# Patient Record
Sex: FEMALE | Race: OTHER | NOT HISPANIC OR LATINO | Employment: UNEMPLOYED | URBAN - METROPOLITAN AREA
[De-identification: names, ages, dates, MRNs, and addresses within clinical notes are randomized per-mention and may not be internally consistent; named-entity substitution may affect disease eponyms.]

---

## 2024-04-19 DIAGNOSIS — E10.40 DM NEUROPATHY, TYPE I DIABETES MELLITUS: Primary | ICD-10-CM

## 2024-05-05 ENCOUNTER — HOSPITAL ENCOUNTER (EMERGENCY)
Facility: HOSPITAL | Age: 24
Discharge: HOME HEALTH | End: 2024-05-05
Attending: STUDENT IN AN ORGANIZED HEALTH CARE EDUCATION/TRAINING PROGRAM
Payer: MEDICAID

## 2024-05-05 VITALS
DIASTOLIC BLOOD PRESSURE: 74 MMHG | OXYGEN SATURATION: 100 % | TEMPERATURE: 98 F | BODY MASS INDEX: 19.99 KG/M2 | WEIGHT: 120 LBS | SYSTOLIC BLOOD PRESSURE: 122 MMHG | HEART RATE: 103 BPM | HEIGHT: 65 IN | RESPIRATION RATE: 17 BRPM

## 2024-05-05 DIAGNOSIS — F41.9 ANXIETY: ICD-10-CM

## 2024-05-05 DIAGNOSIS — R07.9 CHEST PAIN: ICD-10-CM

## 2024-05-05 DIAGNOSIS — E10.69 TYPE 1 DIABETES MELLITUS WITH OTHER SPECIFIED COMPLICATION: Primary | ICD-10-CM

## 2024-05-05 LAB
ALBUMIN SERPL-MCNC: 4.1 G/DL (ref 3.5–5)
ALBUMIN/GLOB SERPL: 1.3 RATIO (ref 1.1–2)
ALP SERPL-CCNC: 72 UNIT/L (ref 40–150)
ALT SERPL-CCNC: 15 UNIT/L (ref 0–55)
APPEARANCE UR: CLEAR
AST SERPL-CCNC: 20 UNIT/L (ref 5–34)
B-HCG SERPL QL: NEGATIVE
B-HCG UR QL: NEGATIVE
BACTERIA #/AREA URNS AUTO: ABNORMAL /HPF
BASOPHILS # BLD AUTO: 0.05 X10(3)/MCL
BASOPHILS NFR BLD AUTO: 0.5 %
BILIRUB SERPL-MCNC: 0.6 MG/DL
BILIRUB UR QL STRIP.AUTO: NEGATIVE
BUN SERPL-MCNC: 12 MG/DL (ref 7–18.7)
CALCIUM SERPL-MCNC: 9.7 MG/DL (ref 8.4–10.2)
CHLORIDE SERPL-SCNC: 104 MMOL/L (ref 98–107)
CO2 SERPL-SCNC: 19 MMOL/L (ref 22–29)
COLOR UR AUTO: ABNORMAL
CREAT SERPL-MCNC: 0.79 MG/DL (ref 0.55–1.02)
CTP QC/QA: YES
D DIMER PPP IA.FEU-MCNC: <0.27 UG/ML FEU (ref 0–0.5)
EOSINOPHIL # BLD AUTO: 0.21 X10(3)/MCL (ref 0–0.9)
EOSINOPHIL NFR BLD AUTO: 2.1 %
ERYTHROCYTE [DISTWIDTH] IN BLOOD BY AUTOMATED COUNT: 11.8 % (ref 11.5–17)
GFR SERPLBLD CREATININE-BSD FMLA CKD-EPI: >60 MLS/MIN/1.73/M2
GLOBULIN SER-MCNC: 3.1 GM/DL (ref 2.4–3.5)
GLUCOSE SERPL-MCNC: 258 MG/DL (ref 74–100)
GLUCOSE UR QL STRIP.AUTO: ABNORMAL
HCT VFR BLD AUTO: 41 % (ref 37–47)
HGB BLD-MCNC: 13.8 G/DL (ref 12–16)
IMM GRANULOCYTES # BLD AUTO: 0.01 X10(3)/MCL (ref 0–0.04)
IMM GRANULOCYTES NFR BLD AUTO: 0.1 %
KETONES UR QL STRIP.AUTO: ABNORMAL
LEUKOCYTE ESTERASE UR QL STRIP.AUTO: NEGATIVE
LYMPHOCYTES # BLD AUTO: 2.39 X10(3)/MCL (ref 0.6–4.6)
LYMPHOCYTES NFR BLD AUTO: 23.4 %
MCH RBC QN AUTO: 28.4 PG (ref 27–31)
MCHC RBC AUTO-ENTMCNC: 33.7 G/DL (ref 33–36)
MCV RBC AUTO: 84.4 FL (ref 80–94)
MONOCYTES # BLD AUTO: 0.57 X10(3)/MCL (ref 0.1–1.3)
MONOCYTES NFR BLD AUTO: 5.6 %
MUCOUS THREADS URNS QL MICRO: ABNORMAL /LPF
NEUTROPHILS # BLD AUTO: 6.99 X10(3)/MCL (ref 2.1–9.2)
NEUTROPHILS NFR BLD AUTO: 68.3 %
NITRITE UR QL STRIP.AUTO: NEGATIVE
NRBC BLD AUTO-RTO: 0 %
PH UR STRIP.AUTO: 6 [PH]
PLATELET # BLD AUTO: 373 X10(3)/MCL (ref 130–400)
PMV BLD AUTO: 10.8 FL (ref 7.4–10.4)
POTASSIUM SERPL-SCNC: 3.8 MMOL/L (ref 3.5–5.1)
PROT SERPL-MCNC: 7.2 GM/DL (ref 6.4–8.3)
PROT UR QL STRIP.AUTO: ABNORMAL
RBC # BLD AUTO: 4.86 X10(6)/MCL (ref 4.2–5.4)
RBC #/AREA URNS AUTO: ABNORMAL /HPF
RBC UR QL AUTO: NEGATIVE
SODIUM SERPL-SCNC: 136 MMOL/L (ref 136–145)
SP GR UR STRIP.AUTO: 1.02 (ref 1–1.03)
SQUAMOUS #/AREA URNS LPF: ABNORMAL /HPF
T3FREE SERPL-MCNC: 3.09 PG/ML (ref 1.58–3.91)
T4 FREE SERPL-MCNC: 1.04 NG/DL (ref 0.7–1.48)
TROPONIN I SERPL-MCNC: <0.01 NG/ML (ref 0–0.04)
TSH SERPL-ACNC: 0.32 UIU/ML (ref 0.35–4.94)
UROBILINOGEN UR STRIP-ACNC: NORMAL
WBC # SPEC AUTO: 10.22 X10(3)/MCL (ref 4.5–11.5)
WBC #/AREA URNS AUTO: ABNORMAL /HPF

## 2024-05-05 PROCEDURE — 80053 COMPREHEN METABOLIC PANEL: CPT | Performed by: NURSE PRACTITIONER

## 2024-05-05 PROCEDURE — 85025 COMPLETE CBC W/AUTO DIFF WBC: CPT | Performed by: NURSE PRACTITIONER

## 2024-05-05 PROCEDURE — 81025 URINE PREGNANCY TEST: CPT | Performed by: NURSE PRACTITIONER

## 2024-05-05 PROCEDURE — 96375 TX/PRO/DX INJ NEW DRUG ADDON: CPT

## 2024-05-05 PROCEDURE — 96361 HYDRATE IV INFUSION ADD-ON: CPT

## 2024-05-05 PROCEDURE — 84484 ASSAY OF TROPONIN QUANT: CPT | Performed by: PHYSICIAN ASSISTANT

## 2024-05-05 PROCEDURE — 84481 FREE ASSAY (FT-3): CPT | Performed by: STUDENT IN AN ORGANIZED HEALTH CARE EDUCATION/TRAINING PROGRAM

## 2024-05-05 PROCEDURE — 25000003 PHARM REV CODE 250: Performed by: PHYSICIAN ASSISTANT

## 2024-05-05 PROCEDURE — 63600175 PHARM REV CODE 636 W HCPCS: Performed by: PHYSICIAN ASSISTANT

## 2024-05-05 PROCEDURE — 84443 ASSAY THYROID STIM HORMONE: CPT | Performed by: STUDENT IN AN ORGANIZED HEALTH CARE EDUCATION/TRAINING PROGRAM

## 2024-05-05 PROCEDURE — 63600175 PHARM REV CODE 636 W HCPCS: Performed by: STUDENT IN AN ORGANIZED HEALTH CARE EDUCATION/TRAINING PROGRAM

## 2024-05-05 PROCEDURE — 81001 URINALYSIS AUTO W/SCOPE: CPT | Performed by: NURSE PRACTITIONER

## 2024-05-05 PROCEDURE — 84439 ASSAY OF FREE THYROXINE: CPT | Performed by: STUDENT IN AN ORGANIZED HEALTH CARE EDUCATION/TRAINING PROGRAM

## 2024-05-05 PROCEDURE — 93005 ELECTROCARDIOGRAM TRACING: CPT

## 2024-05-05 PROCEDURE — 96374 THER/PROPH/DIAG INJ IV PUSH: CPT

## 2024-05-05 PROCEDURE — 85379 FIBRIN DEGRADATION QUANT: CPT | Performed by: STUDENT IN AN ORGANIZED HEALTH CARE EDUCATION/TRAINING PROGRAM

## 2024-05-05 PROCEDURE — 99285 EMERGENCY DEPT VISIT HI MDM: CPT | Mod: 25

## 2024-05-05 PROCEDURE — 81025 URINE PREGNANCY TEST: CPT | Performed by: PHYSICIAN ASSISTANT

## 2024-05-05 RX ORDER — LORAZEPAM 2 MG/ML
1 INJECTION INTRAMUSCULAR
Status: COMPLETED | OUTPATIENT
Start: 2024-05-05 | End: 2024-05-05

## 2024-05-05 RX ORDER — KETOROLAC TROMETHAMINE 10 MG/1
10 TABLET, FILM COATED ORAL EVERY 6 HOURS PRN
Qty: 20 TABLET | Refills: 0 | Status: SHIPPED | OUTPATIENT
Start: 2024-05-05 | End: 2024-05-10

## 2024-05-05 RX ORDER — DROPERIDOL 2.5 MG/ML
1.25 INJECTION, SOLUTION INTRAMUSCULAR; INTRAVENOUS
Status: COMPLETED | OUTPATIENT
Start: 2024-05-05 | End: 2024-05-05

## 2024-05-05 RX ORDER — KETOROLAC TROMETHAMINE 30 MG/ML
30 INJECTION, SOLUTION INTRAMUSCULAR; INTRAVENOUS
Status: COMPLETED | OUTPATIENT
Start: 2024-05-05 | End: 2024-05-05

## 2024-05-05 RX ORDER — HYDROMORPHONE HYDROCHLORIDE 2 MG/ML
1 INJECTION, SOLUTION INTRAMUSCULAR; INTRAVENOUS; SUBCUTANEOUS
Status: DISCONTINUED | OUTPATIENT
Start: 2024-05-05 | End: 2024-05-05

## 2024-05-05 RX ORDER — OXYCODONE AND ACETAMINOPHEN 5; 325 MG/1; MG/1
1 TABLET ORAL EVERY 6 HOURS PRN
Qty: 16 TABLET | Refills: 0 | Status: SHIPPED | OUTPATIENT
Start: 2024-05-05 | End: 2024-05-09

## 2024-05-05 RX ORDER — DICLOFENAC SODIUM 50 MG/1
50 TABLET, DELAYED RELEASE ORAL 3 TIMES DAILY
Qty: 21 TABLET | Refills: 0 | Status: SHIPPED | OUTPATIENT
Start: 2024-05-05 | End: 2024-05-05 | Stop reason: CLARIF

## 2024-05-05 RX ORDER — OXYCODONE HYDROCHLORIDE 10 MG/1
10 TABLET ORAL
Status: COMPLETED | OUTPATIENT
Start: 2024-05-05 | End: 2024-05-05

## 2024-05-05 RX ORDER — ALPRAZOLAM 0.5 MG/1
0.5 TABLET ORAL 3 TIMES DAILY PRN
Qty: 15 TABLET | Refills: 0 | Status: SHIPPED | OUTPATIENT
Start: 2024-05-05 | End: 2024-05-16

## 2024-05-05 RX ADMIN — SODIUM CHLORIDE, POTASSIUM CHLORIDE, SODIUM LACTATE AND CALCIUM CHLORIDE 1000 ML: 600; 310; 30; 20 INJECTION, SOLUTION INTRAVENOUS at 05:05

## 2024-05-05 RX ADMIN — OXYCODONE HYDROCHLORIDE 10 MG: 10 TABLET ORAL at 05:05

## 2024-05-05 RX ADMIN — LORAZEPAM 1 MG: 2 INJECTION INTRAMUSCULAR; INTRAVENOUS at 02:05

## 2024-05-05 RX ADMIN — KETOROLAC TROMETHAMINE 30 MG: 30 INJECTION, SOLUTION INTRAMUSCULAR at 02:05

## 2024-05-05 RX ADMIN — DROPERIDOL 1.25 MG: 2.5 INJECTION, SOLUTION INTRAMUSCULAR; INTRAVENOUS at 03:05

## 2024-05-05 RX ADMIN — SODIUM CHLORIDE, POTASSIUM CHLORIDE, SODIUM LACTATE AND CALCIUM CHLORIDE 2000 ML: 600; 310; 30; 20 INJECTION, SOLUTION INTRAVENOUS at 03:05

## 2024-05-05 RX ADMIN — SODIUM CHLORIDE, POTASSIUM CHLORIDE, SODIUM LACTATE AND CALCIUM CHLORIDE 1000 ML: 600; 310; 30; 20 INJECTION, SOLUTION INTRAVENOUS at 01:05

## 2024-05-05 NOTE — DISCHARGE INSTRUCTIONS
Make sure you are taking insulin daily.  Use Xanax as needed for anxiety.  Take diclofenac for pain and swelling.  Use Percocet for severe pain.  Do not drink or drive while taking narcotics or Xanax as can be sedating.  Take MiraLax daily to help with constipation make sure drinking plenty of fluids.  Follow up with PCP/Endocrinology for further evaluation and treatment.

## 2024-05-05 NOTE — ED PROVIDER NOTES
Encounter Date: 5/5/2024       History     Chief Complaint   Patient presents with    Chest Pain    Abdominal Pain    diabetic issue     C/o of gen pain x4 week ago. Dx with diabetic neuopathy 4 weeks ago. Stating having pain in BLE, CP, and abd pain. Denies N/V/D & fever.      23-year-old female with a history of diabetes and diabetic neuropathy presents to ED for evaluation of generalized pain worsening over the last several days.  Patient states that she feels a burning in her lungs.  States she was having difficulty taking a deep breath due to the burning.  States she was currently waiting on an insulin pump.  Reports that she has been placed on Cymbalta, gabapentin, tramadol, Percocet without relief.  States she can sometimes take Xanax which is the only thing that gives her somewhat of relief.  States she has been seen by multiple ED and has been unable to find an answer.    The history is provided by the patient. No  was used.     Review of patient's allergies indicates:   Allergen Reactions    Bismuth subsalicylate Anaphylaxis    Opioids - morphine analogues Itching     No past medical history on file.  No past surgical history on file.  No family history on file.     Review of Systems   Constitutional:  Negative for chills, fatigue and fever.   Respiratory:  Negative for shortness of breath.    Cardiovascular:  Negative for chest pain.   Gastrointestinal:  Positive for abdominal pain. Negative for diarrhea, nausea and vomiting.   Genitourinary:  Negative for dysuria, flank pain, frequency and urgency.   Musculoskeletal:  Positive for back pain and myalgias.   All other systems reviewed and are negative.      Physical Exam     Initial Vitals [05/05/24 1318]   BP Pulse Resp Temp SpO2   (!) 144/101 (!) 147 (!) 22 98.4 °F (36.9 °C) 99 %      MAP       --         Physical Exam    Nursing note and vitals reviewed.  Constitutional: She appears well-developed and well-nourished.   Patient  anxious crying in room   HENT:   Head: Normocephalic and atraumatic.   Right Ear: Tympanic membrane and external ear normal.   Left Ear: Tympanic membrane and external ear normal.   Mouth/Throat: Uvula is midline, oropharynx is clear and moist and mucous membranes are normal. No trismus in the jaw. No uvula swelling. No oropharyngeal exudate, posterior oropharyngeal edema or posterior oropharyngeal erythema.   Eyes: Conjunctivae are normal. Pupils are equal, round, and reactive to light.   Neck: Neck supple.   Normal range of motion.  Cardiovascular:  Normal rate, regular rhythm and normal heart sounds.           Pulmonary/Chest: Breath sounds normal. She has no wheezes. She has no rhonchi. She has no rales.   Abdominal: Abdomen is soft. Bowel sounds are normal. There is no abdominal tenderness.   Musculoskeletal:         General: Normal range of motion.      Cervical back: Normal range of motion and neck supple.     Neurological: She is alert and oriented to person, place, and time.   Skin: Skin is warm and dry.   Psychiatric: She has a normal mood and affect.         ED Course   Procedures  Labs Reviewed   COMPREHENSIVE METABOLIC PANEL - Abnormal; Notable for the following components:       Result Value    Carbon Dioxide 19 (*)     Glucose Level 258 (*)     All other components within normal limits   URINALYSIS - Abnormal; Notable for the following components:    Protein, UA Trace (*)     Glucose, UA 3+ (*)     Ketones, UA 2+ (*)     Mucous, UA Occasional (*)     All other components within normal limits   CBC WITH DIFFERENTIAL - Abnormal; Notable for the following components:    MPV 10.8 (*)     All other components within normal limits   TSH - Abnormal; Notable for the following components:    TSH 0.320 (*)     All other components within normal limits   PREGNANCY TEST, URINE RAPID - Normal   TROPONIN I - Normal   D DIMER, QUANTITATIVE - Normal   T4, FREE - Normal   T3,FREE ([facility] ONLY) - Normal   CBC W/ AUTO  DIFFERENTIAL    Narrative:     The following orders were created for panel order CBC Auto Differential.  Procedure                               Abnormality         Status                     ---------                               -----------         ------                     CBC with Differential[[id number]]       Abnormal            Final result                 Please view results for these tests on the individual orders.   POCT URINE PREGNANCY     EKG Readings: (Independently Interpreted)   Initial Reading: No STEMI. Rhythm: Sinus Tachycardia. Heart Rate: 124. Ectopy: No Ectopy. Conduction: Normal. ST Segments: Normal ST Segments. T Waves: Normal. Clinical Impression: Sinus Tachycardia       Imaging Results              X-Ray Chest PA And Lateral (Final result)  Result time 05/05/24 13:54:12      Final result by Nasrin Sánchez MD (05/05/24 13:54:12)                   Impression:      No acute cardiopulmonary abnormality.      Electronically signed by: Nasrin Sánchez  Date:    05/05/2024  Time:    13:54               Narrative:    EXAMINATION:  XR CHEST PA AND LATERAL    CLINICAL HISTORY:  Chest pain, unspecified    TECHNIQUE:  Two views of the chest    COMPARISON:  No relevant prior available at the time of dictation.    FINDINGS:  LINES AND TUBES: EKG/telemetry leads overlie the chest.    MEDIASTINUM AND NAYE: The cardiac silhouette is normal.    LUNGS: No lobar consolidation. No edema.    PLEURA:No pleural effusion. No pneumothorax.    BONES: No acute osseous abnormality.                                       Medications   lactated ringers bolus 1,000 mL (0 mLs Intravenous Stopped 5/5/24 1430)   ketorolac injection 30 mg (30 mg Intravenous Given 5/5/24 1427)   LORazepam injection 1 mg (1 mg Intravenous Given 5/5/24 1427)   lactated ringers bolus 2,000 mL (0 mLs Intravenous Stopped 5/5/24 1615)   droPERidol injection 1.25 mg (1.25 mg Intravenous Given 5/5/24 1515)   lactated ringers bolus 1,000 mL  (1,000 mLs Intravenous New Bag 5/5/24 1720)   oxyCODONE immediate release tablet Tab 10 mg (10 mg Oral Given 5/5/24 1715)     Medical Decision Making  23-year-old female with a history of diabetes and diabetic neuropathy presents to ED for evaluation of generalized pain worsening over the last several days.  Patient states that she feels a burning in her lungs.  States she was having difficulty taking a deep breath due to the burning.  States she was currently waiting on an insulin pump.  Reports that she has been placed on Cymbalta, gabapentin, tramadol, Percocet without relief.  States she can sometimes take Xanax which is the only thing that gives her somewhat of relief.  States she has been seen by multiple ED and has been unable to find an answer.    Differential diagnosis includes but is not limited to hyperglycemia, DKA, dehydration, electrolyte abnormality, hyperthyroidism, PE, anxiety    Amount and/or Complexity of Data Reviewed  External Data Reviewed: labs and notes.  Labs: ordered. Decision-making details documented in ED Course.  Radiology: ordered. Decision-making details documented in ED Course.  ECG/medicine tests: ordered and independent interpretation performed. Decision-making details documented in ED Course.  Discussion of management or test interpretation with external provider(s): Patient presents to ED for evaluation of generalized pain with a burning sensation in her lungs.  Patient reports that she was a type 1 diabetic and was recently diagnosed with diabetic neuropathy.  States she has been seen multiple times in his been placed on different medications including gabapentin, Cymbalta, tramadol, and Percocet.  States the only thing that sometimes helps her pain is Xanax.  States that she started having a burning sensation in her lungs in his unable to take a deep breath due to pain.  Patient initially hyperventilating in room.  Patient given Ativan, droperidol, and Toradol with improvement  of symptoms.  Patient is tachycardic and given 4 L of fluids.  No leukocytosis noted.  Glucose of 258 with anion gap of 13.  Thyroid studies obtained with no signs of hyperthyroidism.  Given fluids.  No indication for admission.  Discussed need for follow-up with PCP/Endocrinology.  Patient is in the process of getting an insulin pump.  Discussed following up with Neurology for diabetic neuropathy.  We will give short course of symptomatic care including short course of anti anxiety medication.  Discussed return ED precautions.  Patient verbalizes understanding.  Discussed case with ED attending Dr. Quintanilla, he had face-to-face encounter with the patient and agrees with plan of care.    Risk  Prescription drug management.               ED Course as of 05/05/24 2017   Sun May 05, 2024   1412 Glucose(!): 258 [SL]   1413 CO2(!): 19  Anion gap of 13 [SL]   1413 Troponin I: <0.010 [SL]   1413 WBC: 10.22 [SL]   1413 X-Ray Chest PA And Lateral  No acute abnormality noted [SL]      ED Course User Index  [SL] Brenda Woods PA                           Clinical Impression:  Final diagnoses:  [R07.9] Chest pain  [E10.69] Type 1 diabetes mellitus with other specified complication (Primary)  [F41.9] Anxiety          ED Disposition Condition    Discharge Stable          ED Prescriptions       Medication Sig Dispense Start Date End Date Auth. Provider    ALPRAZolam (XANAX) 0.5 MG tablet Take 1 tablet (0.5 mg total) by mouth 3 (three) times daily as needed for Anxiety. 15 tablet 5/5/2024 5/10/2024 Brenda Woods PA    oxyCODONE-acetaminophen (PERCOCET) 5-325 mg per tablet Take 1 tablet by mouth every 6 (six) hours as needed for Pain. 16 tablet 5/5/2024 5/9/2024 Brenda Woods PA    diclofenac (VOLTAREN) 50 MG EC tablet  (Status: Discontinued) Take 1 tablet (50 mg total) by mouth 3 (three) times daily. for 7 days 21 tablet 5/5/2024 5/5/2024 Brenda Woods PA    ketorolac (TORADOL) 10 mg tablet Take 1 tablet (10 mg total) by mouth every 6 (six)  hours as needed for Pain. 20 tablet 5/5/2024 5/10/2024 Brenda Woods PA          Follow-up Information       Follow up With Specialties Details Why Contact Info    PCP  In 1 week As needed, If you do not have a PCP you may call [phone] to help get set up If you do not have a PCP you may call [phone] to help get set up.             Brenda Woods PA  05/05/24 2017

## 2024-05-05 NOTE — FIRST PROVIDER EVALUATION
Medical screening examination initiated.  I have conducted a focused provider triage encounter, findings are as follows:    Brief history of present illness:  22y/o F presents to the ED with bilateral leg pain that radiates to bilateral feet. States she has been to every hospital and this is her last resort. States having chest pain from being anxious with abdominal pain. Onset four weeks ago.    There were no vitals filed for this visit.    Pertinent physical exam:  AAA x 3    Brief workup plan:  Labs    Preliminary workup initiated; this workup will be continued and followed by the physician or advanced practice provider that is assigned to the patient when roomed.

## 2024-05-06 LAB
OHS QRS DURATION: 70 MS
OHS QTC CALCULATION: 445 MS

## 2024-05-14 NOTE — PROGRESS NOTES
"[facility] Initial Office Visit Note    Initial Visit Date: 5/16/2024  Current Visit Date:  05/16/2024    Chief Complaint:     Chief Complaint   Patient presents with    Pain     Neuropathy all over body    Shortness of Breath     Trouble breathing due to neuropathy    Swelling     Swelling in feet and knees       History of Present Illness:      This is 23 y.o. female with history of type 1 diabetes with DKA, anxiety disorder who is referred for neuropathy. She noted that she had poorly controlled diabetes for at least 4 years. She reports worsening paresthesia in bilateral lower knees. Patient now reports she would occasionally burning sensation in her lungs when her burning sensation worsens. She notes that gabapentin is partially effective. She was on pregabalin from PCP, which she found ineffective. She also find Duloxetine ineffective. She reports history of anxiety that is not well controlled.        Medications:     Current Outpatient Medications   Medication Instructions    albuterol (PROVENTIL/VENTOLIN HFA) 90 mcg/actuation inhaler 2 puffs, Inhalation, Every 6 hours PRN, Rescue    alpha lipoic acid 200 mg, Oral, Daily    ALPRAZolam (XANAX) 1 mg, Oral, 3 times daily PRN    FREESTYLE JOSÉ MIGUEL 2 SENSOR Kit USE FOR continous glucose monitoring    gabapentin (NEURONTIN) 1,600 mg, Oral, 2 times daily    GUARDIAN 4 GLUCOSE SENSOR Bessie Change sensor every  6 days    GUARDIAN 4 TRANSMITTER Bessie Change transmitter every 6 days    insulin aspart U-100 (NOVOLOG) 100 unit/mL (3 mL) InPn pen 5 units in the AM, 7 units with lunch and dinner    insulin syringe-needle U-100 0.5 mL 31 gauge x 5/16" Syrg To use with insulin vial Daily    LANTUS SOLOSTAR U-100 INSULIN 35 Units, Subcutaneous, Daily    magnesium oxide (MAG-OX) 400 mg (241.3 mg magnesium) tablet 1 tablet, Oral, 2 times daily    NOVOLOG U-100 INSULIN ASPART 100 unit/mL injection Inject up to 100 Units via insulin pump per day    oxyCODONE-acetaminophen " "(PERCOCET) 5-325 mg per tablet 1 tablet, Oral, Every 4 hours PRN    traMADoL (ULTRAM) 100 mg, Oral, Nightly PRN    TRUEPLUS INSULIN 0.5 mL 30 gauge x 5/16" Syrg        Labs:     Results for orders placed or performed during the hospital encounter of 05/05/24   Comprehensive metabolic panel   Result Value Ref Range    Sodium 136 136 - 145 mmol/L    Potassium 3.8 3.5 - 5.1 mmol/L    Chloride 104 98 - 107 mmol/L    CO2 19 (L) 22 - 29 mmol/L    Glucose 258 (H) 74 - 100 mg/dL    Blood Urea Nitrogen 12.0 7.0 - 18.7 mg/dL    Creatinine 0.79 0.55 - 1.02 mg/dL    Calcium 9.7 8.4 - 10.2 mg/dL    Protein Total 7.2 6.4 - 8.3 gm/dL    Albumin 4.1 3.5 - 5.0 g/dL    Globulin 3.1 2.4 - 3.5 gm/dL    Albumin/Globulin Ratio 1.3 1.1 - 2.0 ratio    Bilirubin Total 0.6 <=1.5 mg/dL    ALP 72 40 - 150 unit/L    ALT 15 0 - 55 unit/L    AST 20 5 - 34 unit/L    eGFR >60 mls/min/1.73/m2   Pregnancy, urine rapid   Result Value Ref Range    hCG Qualitative, Urine Negative Negative   Urinalysis   Result Value Ref Range    Color, UA Light-Yellow Yellow, Light-Yellow, Colorless, Straw, Dark-Yellow    Appearance, UA Clear Clear    Specific Gravity, UA 1.018 1.005 - 1.030    pH, UA 6.0 5.0 - 8.5    Protein, UA Trace (A) Negative    Glucose, UA 3+ (A) Negative, Normal    Ketones, UA 2+ (A) Negative    Blood, UA Negative Negative    Bilirubin, UA Negative Negative    Urobilinogen, UA Normal 0.2, 1.0, Normal    Nitrites, UA Negative Negative    Leukocyte Esterase, UA Negative Negative    WBC, UA 0-5 None Seen, 0-2, 3-5, 0-5 /HPF    Bacteria, UA Trace None Seen, Trace /HPF    Squamous Epithelial Cells, UA Trace None Seen /HPF    Mucous, UA Occasional (A) None Seen /LPF    RBC, UA 0-5 None Seen, 0-2, 3-5, 0-5 /HPF   CBC with Differential   Result Value Ref Range    WBC 10.22 4.50 - 11.50 x10(3)/mcL    RBC 4.86 4.20 - 5.40 x10(6)/mcL    Hgb 13.8 12.0 - 16.0 g/dL    Hct 41.0 37.0 - 47.0 %    MCV 84.4 80.0 - 94.0 fL    MCH 28.4 27.0 - 31.0 pg    MCHC 33.7 " 33.0 - 36.0 g/dL    RDW 11.8 11.5 - 17.0 %    Platelet 373 130 - 400 x10(3)/mcL    MPV 10.8 (H) 7.4 - 10.4 fL    Neut % 68.3 %    Lymph % 23.4 %    Mono % 5.6 %    Eos % 2.1 %    Basophil % 0.5 %    Lymph # 2.39 0.6 - 4.6 x10(3)/mcL    Neut # 6.99 2.1 - 9.2 x10(3)/mcL    Mono # 0.57 0.1 - 1.3 x10(3)/mcL    Eos # 0.21 0 - 0.9 x10(3)/mcL    Baso # 0.05 <=0.2 x10(3)/mcL    IG# 0.01 0 - 0.04 x10(3)/mcL    IG% 0.1 %    NRBC% 0.0 %   Troponin I   Result Value Ref Range    Troponin-I <0.010 0.000 - 0.045 ng/mL   TSH   Result Value Ref Range    TSH 0.320 (L) 0.350 - 4.940 uIU/mL   D dimer, quantitative   Result Value Ref Range    D-Dimer <0.27 0.00 - 0.50 ug/mL FEU   T4, Free   Result Value Ref Range    Thyroxine Free 1.04 0.70 - 1.48 ng/dL   T3, Free ([facility])   Result Value Ref Range    T3 Free 3.09 1.58 - 3.91 pg/mL   POCT urine pregnancy   Result Value Ref Range    POC Preg Test, Ur Negative Negative     Acceptable Yes    EKG 12-lead   Result Value Ref Range    QRS Duration 70 ms    [facility] QTC Calculation 445 ms       Studies:          Review of Systems:     Review of Systems   All other systems reviewed and are negative.      Physical Exams:     Vitals:    05/16/24 1135   BP: 111/74   Pulse: (!) 132   Resp: 16   Temp: 97.9 °F (36.6 °C)       Physical Exam  Vitals and nursing note reviewed.   Constitutional:       Appearance: Normal appearance.   HENT:      Head: Normocephalic and atraumatic.      Nose: Nose normal.      Mouth/Throat:      Mouth: Mucous membranes are moist.      Pharynx: Oropharynx is clear.   Eyes:      Conjunctiva/sclera: Conjunctivae normal.   Cardiovascular:      Rate and Rhythm: Normal rate and regular rhythm.      Pulses: Normal pulses.   Pulmonary:      Effort: Pulmonary effort is normal.      Breath sounds: Normal breath sounds.   Abdominal:      General: Abdomen is flat.   Musculoskeletal:         General: Normal range of motion.      Cervical back: Normal range of motion.   Skin:      General: Skin is warm.   Neurological:      Mental Status: She is alert.         Comprehensive Neurological Exam:  Mental Status: Alert Oriented to Self, Date, and Place.  Comprehension wnl. No dysarthria.   CN II - XII: SELINA, No APD, VFFC, No ptosis OU, EOMI without nystagmus, LT/Temp symmetric in CN V1-3 distribution, Hearing grossly intact, Face Symmetric, Tongue and Uvula midline, Trapezius symmetric bilateral.   Motor: tone and bulk wnl throughout, no abnormal involuntary or voluntary movements, 5/5 to confrontation, Fine finger movements wnl b/l, No pronator drift.   Sensory: LT, Proprioception, Vibration decrease to bilateral ankles, PP, Temp symmetric decrease to 1/3 bilateral LE.   Reflexes: 2+ throughout, plantar reflexes downward bilateral.   Cerebellar: FNF wnl b/l, RAHM wnl b/l  Romberg: Negative  Tandem Romberg: wnl   Gait: normal.     Assessment:     This is 23 y.o. female with history of  type 1 diabetes, anxiety disorder who is referred for diabetic polyneuropathy and worsening anxiety and depression.     Problem List Items Addressed This Visit    None  Visit Diagnoses       Polyneuropathy due to type 1 diabetes mellitus    -  Primary    Relevant Medications    LANTUS SOLOSTAR U-100 INSULIN 100 unit/mL (3 mL) InPn pen    insulin aspart U-100 (NOVOLOG) 100 unit/mL (3 mL) InPn pen    NOVOLOG U-100 INSULIN ASPART 100 unit/mL injection    DM neuropathy, type I diabetes mellitus        Relevant Medications    LANTUS SOLOSTAR U-100 INSULIN 100 unit/mL (3 mL) InPn pen    insulin aspart U-100 (NOVOLOG) 100 unit/mL (3 mL) InPn pen    NOVOLOG U-100 INSULIN ASPART 100 unit/mL injection            Plan:     [] start Carbamazepine 200 mg twice a day   [] continue with Gabapentin   [] recommend biofeedback and CBT for pain control.   [] CBC,CMP in 2 months     RTC 3 Months      Visit today is associated with current or anticipated ongoing medical care related to this patient's single serious condition/complex  condition as documented above.     I have explained the treatment plan, diagnosis, and prognosis to patient. All questions are answered to the best of my knowledge.     Face to face time 45 minutes, including documentation, chart review, counseling, education, review of test results, relevant medical records, and coordination of care.   I have explained the treatment plan, diagnosis, and prognosis to patient. All questions are answered to the best of my knowledge.       Alyssa Dennis MD   General Neurology  05/16/2024

## 2024-05-16 ENCOUNTER — OFFICE VISIT (OUTPATIENT)
Dept: NEUROLOGY | Facility: CLINIC | Age: 24
End: 2024-05-16
Payer: MEDICAID

## 2024-05-16 VITALS
DIASTOLIC BLOOD PRESSURE: 74 MMHG | OXYGEN SATURATION: 99 % | RESPIRATION RATE: 16 BRPM | BODY MASS INDEX: 19.16 KG/M2 | TEMPERATURE: 98 F | WEIGHT: 115 LBS | SYSTOLIC BLOOD PRESSURE: 111 MMHG | HEIGHT: 65 IN | HEART RATE: 132 BPM

## 2024-05-16 DIAGNOSIS — E10.40 DM NEUROPATHY, TYPE I DIABETES MELLITUS: ICD-10-CM

## 2024-05-16 DIAGNOSIS — E10.42 POLYNEUROPATHY DUE TO TYPE 1 DIABETES MELLITUS: Primary | ICD-10-CM

## 2024-05-16 PROCEDURE — 99215 OFFICE O/P EST HI 40 MIN: CPT | Mod: PBBFAC | Performed by: PSYCHIATRY & NEUROLOGY

## 2024-05-16 PROCEDURE — 3074F SYST BP LT 130 MM HG: CPT | Mod: CPTII,,, | Performed by: PSYCHIATRY & NEUROLOGY

## 2024-05-16 PROCEDURE — 99204 OFFICE O/P NEW MOD 45 MIN: CPT | Mod: S$PBB,,, | Performed by: PSYCHIATRY & NEUROLOGY

## 2024-05-16 PROCEDURE — 3078F DIAST BP <80 MM HG: CPT | Mod: CPTII,,, | Performed by: PSYCHIATRY & NEUROLOGY

## 2024-05-16 PROCEDURE — 3008F BODY MASS INDEX DOCD: CPT | Mod: CPTII,,, | Performed by: PSYCHIATRY & NEUROLOGY

## 2024-05-16 PROCEDURE — G2211 COMPLEX E/M VISIT ADD ON: HCPCS | Mod: S$PBB,,, | Performed by: PSYCHIATRY & NEUROLOGY

## 2024-05-16 PROCEDURE — 3051F HG A1C>EQUAL 7.0%<8.0%: CPT | Mod: CPTII,,, | Performed by: PSYCHIATRY & NEUROLOGY

## 2024-05-16 PROCEDURE — 1159F MED LIST DOCD IN RCRD: CPT | Mod: CPTII,,, | Performed by: PSYCHIATRY & NEUROLOGY

## 2024-05-16 RX ORDER — LANOLIN ALCOHOL/MO/W.PET/CERES
1 CREAM (GRAM) TOPICAL 2 TIMES DAILY
COMMUNITY
Start: 2024-03-13

## 2024-05-16 RX ORDER — ALPRAZOLAM 1 MG/1
1 TABLET ORAL 3 TIMES DAILY PRN
COMMUNITY
Start: 2024-05-06

## 2024-05-16 RX ORDER — GABAPENTIN 800 MG/1
1600 TABLET ORAL 2 TIMES DAILY
COMMUNITY
Start: 2024-04-15

## 2024-05-16 RX ORDER — NAPROXEN SODIUM 220 MG
TABLET ORAL
COMMUNITY
Start: 2024-05-13

## 2024-05-16 RX ORDER — CARBAMAZEPINE 200 MG/1
200 CAPSULE, EXTENDED RELEASE ORAL 2 TIMES DAILY
Qty: 60 CAPSULE | Refills: 5 | Status: SHIPPED | OUTPATIENT
Start: 2024-05-16 | End: 2024-06-10

## 2024-05-16 RX ORDER — ALBUTEROL SULFATE 90 UG/1
2 AEROSOL, METERED RESPIRATORY (INHALATION) EVERY 6 HOURS PRN
COMMUNITY

## 2024-05-16 RX ORDER — OXYCODONE AND ACETAMINOPHEN 5; 325 MG/1; MG/1
1 TABLET ORAL EVERY 4 HOURS PRN
COMMUNITY
End: 2024-05-16

## 2024-05-16 RX ORDER — INSULIN GLARGINE 100 [IU]/ML
35 INJECTION, SOLUTION SUBCUTANEOUS DAILY
COMMUNITY
Start: 2024-04-03

## 2024-05-16 RX ORDER — INSULIN ASPART 100 [IU]/ML
INJECTION, SOLUTION INTRAVENOUS; SUBCUTANEOUS
COMMUNITY
Start: 2024-04-15

## 2024-05-16 RX ORDER — PERPHENAZINE 16 MG
200 TABLET ORAL DAILY
COMMUNITY
Start: 2024-04-23

## 2024-05-16 RX ORDER — SYRING-NEEDL,DISP,INSUL,0.3 ML 30 GX5/16"
SYRINGE, EMPTY DISPOSABLE MISCELLANEOUS
COMMUNITY
Start: 2024-05-14

## 2024-05-16 RX ORDER — TRAMADOL HYDROCHLORIDE 50 MG/1
100 TABLET ORAL NIGHTLY PRN
COMMUNITY
Start: 2024-05-10

## 2024-05-16 RX ORDER — BLOOD-GLUCOSE SENSOR
EACH MISCELLANEOUS
COMMUNITY
Start: 2024-04-15

## 2024-05-16 RX ORDER — FLASH GLUCOSE SENSOR
KIT MISCELLANEOUS
COMMUNITY

## 2024-05-16 RX ORDER — BLOOD-GLUCOSE TRANSMITTER
EACH MISCELLANEOUS
COMMUNITY
Start: 2024-04-15

## 2024-06-06 ENCOUNTER — TELEPHONE (OUTPATIENT)
Dept: NEUROLOGY | Facility: CLINIC | Age: 24
End: 2024-06-06
Payer: MEDICAID

## 2024-06-06 NOTE — TELEPHONE ENCOUNTER
----- Message from Mickie Fink sent at 6/6/2024 11:05 AM CDT -----  Regarding: medication not working  Pt called because he has been taking carBAMazepine (CARBATROL) 200 MG CM12 as prescribed but she is still struggling with the pain. Pt states that the pain is unbearable. Please advise    [phone].

## 2024-06-07 ENCOUNTER — PATIENT MESSAGE (OUTPATIENT)
Dept: NEUROLOGY | Facility: CLINIC | Age: 24
End: 2024-06-07
Payer: MEDICAID

## 2024-06-07 ENCOUNTER — TELEPHONE (OUTPATIENT)
Dept: NEUROLOGY | Facility: CLINIC | Age: 24
End: 2024-06-07
Payer: MEDICAID

## 2024-06-07 DIAGNOSIS — M79.2 NEUROPATHIC PAIN: ICD-10-CM

## 2024-06-07 DIAGNOSIS — E10.42 POLYNEUROPATHY DUE TO TYPE 1 DIABETES MELLITUS: Primary | ICD-10-CM

## 2024-06-07 NOTE — TELEPHONE ENCOUNTER
----- Message from Bee Todd sent at 6/7/2024  8:22 AM CDT -----  Regarding: Sooner Appointment  Patient called and stated she needs a sooner follow up than September due to recently having a visit out of the ER and is needing a sooner appointment to follow up.    Patient can be reached at [phone]     Please Advise

## 2024-06-07 NOTE — TELEPHONE ENCOUNTER
Portal message sent to patient--per Dr Dennis can refer to pain management and will have medical office assistants contact patient with sooner appointment.

## 2024-06-10 RX ORDER — CARBAMAZEPINE 300 MG/1
300 CAPSULE, EXTENDED RELEASE ORAL 2 TIMES DAILY
Qty: 60 CAPSULE | Refills: 5 | Status: SHIPPED | OUTPATIENT
Start: 2024-06-10 | End: 2024-12-07

## 2024-06-10 NOTE — TELEPHONE ENCOUNTER
Notified Ms Marques of referral to pain management. Also informed Dr Dennis has sent new prescription to [facility] increasing Carbamazepine to 300mg twice a day. Patient states she now uses [address] in [place]. Provided contact number for Pain Management clinic in [place] and Ms Marques verbally accepted number. Also provided new appointment for 7/17/2024 at 2:00pm with Dr Dennis-Ms Marques verbally accepted.         Spoke to Susan with [facility] and they already transferred new Carbamazepine prescription to [address] in [place].

## 2024-06-10 NOTE — TELEPHONE ENCOUNTER
Spoke to Ms Marques this am-she was crying and stating she cannot take the pain anymore. Can not walk due to pain    Confirmed she is taking the following medications:   Carbamazepine 200mg- 1 tablet BID    Tramadol 50 mg-2 tablets at bedtime as needed   Gbapentin 800mg- 2 tablets twice a day   Has hdrocodone from recent ED visit    Did explain per Dr Dennis-we will have medical office staff contact with sooner appointment, but their is not much more Dr Dennis can do for her neuropathic pain, but can refer to pain management and informed only pain management facility taking insurance is [facility] in [place]. Ms Marques in agreement for pain management referral.    Do you want to change any medications?    Thank you

## 2024-06-19 ENCOUNTER — HOSPITAL ENCOUNTER (EMERGENCY)
Facility: HOSPITAL | Age: 24
Discharge: HOME | End: 2024-06-19
Attending: STUDENT IN AN ORGANIZED HEALTH CARE EDUCATION/TRAINING PROGRAM
Payer: MEDICAID

## 2024-06-19 VITALS
HEART RATE: 100 BPM | TEMPERATURE: 98 F | DIASTOLIC BLOOD PRESSURE: 88 MMHG | BODY MASS INDEX: 19.63 KG/M2 | OXYGEN SATURATION: 98 % | RESPIRATION RATE: 18 BRPM | WEIGHT: 115 LBS | SYSTOLIC BLOOD PRESSURE: 128 MMHG | HEIGHT: 64 IN

## 2024-06-19 DIAGNOSIS — S46.811A STRAIN OF RIGHT TRAPEZIUS MUSCLE, INITIAL ENCOUNTER: Primary | ICD-10-CM

## 2024-06-19 LAB — B-HCG UR QL: NEGATIVE

## 2024-06-19 PROCEDURE — 99284 EMERGENCY DEPT VISIT MOD MDM: CPT | Mod: 25

## 2024-06-19 PROCEDURE — 81025 URINE PREGNANCY TEST: CPT | Performed by: STUDENT IN AN ORGANIZED HEALTH CARE EDUCATION/TRAINING PROGRAM

## 2024-06-19 PROCEDURE — 96372 THER/PROPH/DIAG INJ SC/IM: CPT | Performed by: STUDENT IN AN ORGANIZED HEALTH CARE EDUCATION/TRAINING PROGRAM

## 2024-06-19 PROCEDURE — 25000003 PHARM REV CODE 250: Performed by: STUDENT IN AN ORGANIZED HEALTH CARE EDUCATION/TRAINING PROGRAM

## 2024-06-19 PROCEDURE — 63600175 PHARM REV CODE 636 W HCPCS: Performed by: STUDENT IN AN ORGANIZED HEALTH CARE EDUCATION/TRAINING PROGRAM

## 2024-06-19 RX ORDER — KETOROLAC TROMETHAMINE 30 MG/ML
30 INJECTION, SOLUTION INTRAMUSCULAR; INTRAVENOUS
Status: COMPLETED | OUTPATIENT
Start: 2024-06-19 | End: 2024-06-19

## 2024-06-19 RX ORDER — KETOROLAC TROMETHAMINE 10 MG/1
10 TABLET, FILM COATED ORAL 3 TIMES DAILY
Qty: 15 TABLET | Refills: 0 | Status: SHIPPED | OUTPATIENT
Start: 2024-06-19 | End: 2024-06-24

## 2024-06-19 RX ORDER — METHOCARBAMOL 750 MG/1
1500 TABLET, FILM COATED ORAL 2 TIMES DAILY PRN
Qty: 20 TABLET | Refills: 0 | Status: SHIPPED | OUTPATIENT
Start: 2024-06-19 | End: 2024-06-24

## 2024-06-19 RX ORDER — METHOCARBAMOL 500 MG/1
1000 TABLET, FILM COATED ORAL
Status: COMPLETED | OUTPATIENT
Start: 2024-06-19 | End: 2024-06-19

## 2024-06-19 RX ADMIN — METHOCARBAMOL TABLETS 1000 MG: 500 TABLET, COATED ORAL at 09:06

## 2024-06-19 RX ADMIN — KETOROLAC TROMETHAMINE 30 MG: 30 INJECTION, SOLUTION INTRAMUSCULAR at 09:06

## 2024-06-19 NOTE — ED PROVIDER NOTES
Encounter Date: 6/19/2024       History     Chief Complaint   Patient presents with    Neck Pain     Right sided neck pain.Hx of falling out of bed last night  Pt notes pain to be  shocking in nature .     Patient is a 23-year-old female with a history of type 1 diabetes who presents to the ER today due to right-sided neck stiffness.  She states she accidentally rolled out of bed yesterday denies any head trauma loss of consciousness.  She states she had no symptoms at the time and went back to sleep.  She states she awoke this morning with some right-sided trapezius muscle pain.  She states it was worse with movement and better with rest.  She did not take anything for it and instead immediately presented to the emergency room.  She states every once in all certain movements seem to elicit a sharp shooting sensation in the posterior aspect of the right arm.  She denies any headaches, vision changes, chest pain, shortness of breath or abdominal pain.      Review of patient's allergies indicates:   Allergen Reactions    Bismuth subsalicylate Anaphylaxis    Banana     Opioids - morphine analogues Itching    Cucumber Other (See Comments)     Itchy throat    Morphine Itching     Past Medical History:   Diagnosis Date    Anxiety disorder, unspecified     Asthma     Neuropathy     Type 1 diabetes      Past Surgical History:   Procedure Laterality Date    WISDOM TOOTH EXTRACTION       No family history on file.  Social History     Tobacco Use    Smoking status: Never    Smokeless tobacco: Never   Substance Use Topics    Alcohol use: Not Currently    Drug use: Not Currently     Review of Systems   Constitutional:  Negative for chills, fatigue and fever.   HENT:  Negative for congestion, sore throat and trouble swallowing.    Eyes:  Negative for pain and visual disturbance.   Respiratory:  Negative for cough, shortness of breath and wheezing.    Cardiovascular:  Negative for chest pain and palpitations.   Gastrointestinal:   Negative for abdominal pain, blood in stool, constipation, diarrhea, nausea and vomiting.   Genitourinary:  Negative for dysuria and hematuria.   Musculoskeletal:  Positive for neck pain and neck stiffness. Negative for back pain and myalgias.   Skin:  Negative for rash and wound.   Neurological:  Negative for seizures, syncope and headaches.   Psychiatric/Behavioral:  Negative for confusion. The patient is not nervous/anxious.        Physical Exam     Initial Vitals [06/19/24 0844]   BP Pulse Resp Temp SpO2   120/84 (!) 135 18 97.8 °F (36.6 °C) 98 %      MAP       --         Physical Exam    Nursing note and vitals reviewed.  Constitutional: She appears well-developed and well-nourished. She is not diaphoretic. No distress.   HENT:   Head: Normocephalic.   Right Ear: External ear normal.   Left Ear: External ear normal.   Nose: Nose normal.   Eyes: Conjunctivae and EOM are normal. Right eye exhibits no discharge. Left eye exhibits no discharge. No scleral icterus.   Neck:   Normal range of motion.  Cardiovascular:  Normal rate, regular rhythm and normal heart sounds.     Exam reveals no gallop and no friction rub.       No murmur heard.  Pulmonary/Chest: Breath sounds normal. No stridor. No respiratory distress. She has no wheezes. She has no rhonchi. She has no rales.   Musculoskeletal:         General: Normal range of motion.      Cervical back: Normal range of motion.      Comments: There is no C, T, L-spine tenderness no step-off lesions.  Tenderness over the right-sided trapezius muscle with no obvious signs of trauma or trigger points identified.  Pain that patient describes it was reproducible with palpation over this area.     Neurological: She is alert.   Skin: Skin is warm. No rash noted. No erythema.   Psychiatric: She has a normal mood and affect. Her behavior is normal.         ED Course   Procedures  Labs Reviewed   HCG QUALITATIVE URINE - Normal          Imaging Results              X-Ray Cervical  Spine 2 or 3 Views (Final result)  Result time 06/19/24 09:37:05      Final result by Celso Savage MD (06/19/24 09:37:05)                   Impression:      No acute osseous findings      Electronically signed by: Celso Savage MD  Date:    06/19/2024  Time:    09:37               Narrative:    EXAMINATION:  Three views cervical spine    CLINICAL HISTORY:  Fall    COMPARISON:  None    FINDINGS:  No fracture or traumatic subluxation.  Vertebral bodies are normal in height and alignment posteriorly.  Dextrocurvature of the cervical spine is likely positional.                                       Medications   ketorolac injection 30 mg (30 mg Intramuscular Given 6/19/24 0932)   methocarbamoL tablet 1,000 mg (1,000 mg Oral Given 6/19/24 0931)     Medical Decision Making  Differentials:  Muscle strain, C-spine injury, cervical radiculopathy   Historian is the patient   Overall well-appearing 23-year-old female presents with right-sided neck pain.  This all stemmed from a traumatic event.  Denies any head trauma in his no clinical signs head trauma on exam.  X-ray C-spine showed no acute process after UPT was negative.  Patient is opposed to opioids at this time as she states it was side-effect profile is not worth it to her.  Toradol and Robaxin given here in the emergency room.  No allergic response noted.  Patient states she did not have significant improvement however she does not want to try any other alleviating medications.  She would rather rice therapy at home and continued muscle relaxers and anti-inflammatories.  Trapezius muscle tenderness is noted on exam and this leads the differential as the source of the strain.  All questions answered in layman's terms and return precautions were discussed.      Amount and/or Complexity of Data Reviewed  Labs: ordered. Decision-making details documented in ED Course.  Radiology: ordered. Decision-making details documented in ED Course.    Risk  Prescription drug  management.                                      Clinical Impression:  Final diagnoses:  [S46.812Z] Strain of right trapezius muscle, initial encounter (Primary)          ED Disposition Condition    Discharge Stable          ED Prescriptions       Medication Sig Dispense Start Date End Date Auth. Provider    ketorolac (TORADOL) 10 mg tablet Take 1 tablet (10 mg total) by mouth 3 (three) times daily. for 5 days 15 tablet 6/19/2024 6/24/2024 Kurt Mayer MD    methocarbamoL (ROBAXIN) 750 MG Tab Take 2 tablets (1,500 mg total) by mouth 2 (two) times daily as needed (pain). 20 tablet 6/19/2024 6/24/2024 Kurt Mayer MD          Follow-up Information       Follow up With Specialties Details Why Contact Info    [facility] - Emergency Dept Emergency Medicine  If symptoms worsen [address] [place] [id number] [phone]    Leonides Pimentel MD Family Medicine Schedule an appointment as soon as possible for a visit   [address] [place] [address]  [place] [id number]  [phone]               Kurt Mayer MD  06/19/24 9285

## 2024-07-03 ENCOUNTER — PATIENT MESSAGE (OUTPATIENT)
Dept: NEUROLOGY | Facility: CLINIC | Age: 24
End: 2024-07-03
Payer: MEDICAID

## 2024-07-03 DIAGNOSIS — E10.42 POLYNEUROPATHY DUE TO TYPE 1 DIABETES MELLITUS: ICD-10-CM

## 2024-07-03 RX ORDER — CARBAMAZEPINE 300 MG/1
300 CAPSULE, EXTENDED RELEASE ORAL 3 TIMES DAILY
Qty: 90 CAPSULE | Refills: 5 | Status: SHIPPED | OUTPATIENT
Start: 2024-07-03 | End: 2024-12-30

## 2024-07-15 NOTE — PROGRESS NOTES
[facility] Neurology Follow Up Office Visit Note    Initial Visit Date: 5/16/2024  Last Visit Date: 5/16/2024  Current Visit Date:  07/17/2024    Chief Complaint:     Chief Complaint   Patient presents with    Polyneuropathy     States new dose of carbamazepine 300mg TID is helping-but still in extreme pain from 1-4am.       History of Present Illness:      This is 23 y.o. female with history of type 1 diabetes with DKA, anxiety disorder who is referred for diabetic polyneuropathy and worsening anxiety and depression. During last visit, carbamazepine 200 mg twice a day was started. Patient continues to have painful attacks that requires her to go to ER. Carbamazepine has been increased to 300 mg three times a day this month.     Interim History  Still complaining of pain at night. States that she is not taking requip, which her PCP had started her on.     Presenting History  She noted that she had poorly controlled diabetes for at least 4 years. She reports worsening paresthesia in bilateral lower knees. Patient now reports she would occasionally burning sensation in her lungs when her burning sensation worsens. She notes that gabapentin is partially effective. She was on pregabalin from PCP, which she found ineffective. She also find Duloxetine ineffective. She reports history of anxiety that is not well controlled.        Medications:     Current Outpatient Medications   Medication Instructions    albuterol (PROVENTIL/VENTOLIN HFA) 90 mcg/actuation inhaler 2 puffs, Inhalation, Every 6 hours PRN, Rescue    alpha lipoic acid 200 mg, Oral, Daily    ALPRAZolam (XANAX) 1 mg, Oral, 3 times daily PRN    carBAMazepine (CARBATROL) 300 mg, Oral, 3 times daily    FREESTYLE JOSÉ MIGUEL 2 SENSOR Kit USE FOR continous glucose monitoring    gabapentin (NEURONTIN) 1,600 mg, Oral, 2 times daily    GUARDIAN 4 GLUCOSE SENSOR Bessie Change sensor every  6 days    GUARDIAN 4 TRANSMITTER Bessie Change transmitter every 6 days    insulin aspart U-100  "(NOVOLOG) 100 unit/mL (3 mL) InPn pen 5 units in the AM, 7 units with lunch and dinner    insulin syringe-needle U-100 0.5 mL 31 gauge x 5/16" Syrg To use with insulin vial Daily    LANTUS SOLOSTAR U-100 INSULIN 35 Units, Subcutaneous, Daily    magnesium oxide (MAG-OX) 400 mg (241.3 mg magnesium) tablet 1 tablet, Oral, 2 times daily    NOVOLOG U-100 INSULIN ASPART 100 unit/mL injection Inject up to 100 Units via insulin pump per day    traMADoL (ULTRAM) 100 mg, Oral, Nightly PRN    TRUEPLUS INSULIN 0.5 mL 30 gauge x 5/16" Syrg        Labs:     Results for orders placed or performed during the hospital encounter of 06/19/24   HCG Qualitative Urine   Result Value Ref Range    hCG Qualitative, Urine Negative Negative            Component Ref Range & Units 1 mo ago 2 mo ago 1 yr ago   Hemoglobin A1C <=6.5 % 7.1 High  7.7 High  >14.0 High  R, CM        Studies:          Review of Systems:     Review of Systems   All other systems reviewed and are negative.      Physical Exams:     Vitals:    07/17/24 1424   BP: 120/80   Pulse: (!) 125   Resp: 14   Temp: 98.2 °F (36.8 °C)         Physical Exam  Vitals and nursing note reviewed.   Constitutional:       Appearance: Normal appearance.   HENT:      Head: Normocephalic and atraumatic.      Nose: Nose normal.      Mouth/Throat:      Mouth: Mucous membranes are moist.      Pharynx: Oropharynx is clear.   Eyes:      Conjunctiva/sclera: Conjunctivae normal.   Cardiovascular:      Rate and Rhythm: Normal rate and regular rhythm.      Pulses: Normal pulses.   Pulmonary:      Effort: Pulmonary effort is normal.      Breath sounds: Normal breath sounds.   Abdominal:      General: Abdomen is flat.   Musculoskeletal:         General: Normal range of motion.      Cervical back: Normal range of motion.   Skin:     General: Skin is warm.   Neurological:      Mental Status: She is alert.         Comprehensive Neurological Exam:  Mental Status: Alert Oriented to Self, Date, and Place.  " Comprehension wnl. No dysarthria.   CN II - XII: SELINA, No APD, VFFC, No ptosis OU, EOMI without nystagmus, LT/Temp symmetric in CN V1-3 distribution, Hearing grossly intact, Face Symmetric, Tongue and Uvula midline, Trapezius symmetric bilateral.   Motor: tone and bulk wnl throughout, no abnormal involuntary or voluntary movements, 5/5 to confrontation, Fine finger movements wnl b/l, No pronator drift.   Sensory: LT, Proprioception, Vibration decrease to bilateral ankles, PP, Temp symmetric decrease to 1/3 bilateral LE.   Reflexes: 2+ throughout, plantar reflexes downward bilateral.   Cerebellar: FNF wnl b/l, RAHM wnl b/l  Romberg: Negative  Tandem Romberg: wnl   Gait: normal.     Assessment:     This is 23 y.o. female with history of  type 1 diabetes, anxiety disorder who is referred for diabetic polyneuropathy and worsening anxiety and depression.     Problem List Items Addressed This Visit    None  Visit Diagnoses       Polyneuropathy due to type 1 diabetes mellitus    -  Primary    Relevant Orders    EKG 12-lead    DM neuropathy, type I diabetes mellitus        Relevant Medications    amitriptyline (ELAVIL) 25 MG tablet              Plan:     [] continue with Carbamazepine 300 mg three times a day   [] start Amitriptyline 25 mg at bedtime  [] continue with Gabapentin   [] continue with Duloxetine   [] recommend pain management referral   [] EKG     RTC 3 Months      Visit today is associated with current or anticipated ongoing medical care related to this patient's single serious condition/complex condition as documented above.     I have explained the treatment plan, diagnosis, and prognosis to patient. All questions are answered to the best of my knowledge.     Face to face time 40 minutes, including documentation, chart review, counseling, education, review of test results, relevant medical records, and coordination of care.   I have explained the treatment plan, diagnosis, and prognosis to patient. All  questions are answered to the best of my knowledge.       Alyssa Dennis MD   General Neurology  07/17/2024

## 2024-07-17 ENCOUNTER — HOSPITAL ENCOUNTER (OUTPATIENT)
Dept: CARDIOLOGY | Facility: HOSPITAL | Age: 24
Discharge: HOME | End: 2024-07-17
Attending: PSYCHIATRY & NEUROLOGY
Payer: MEDICAID

## 2024-07-17 ENCOUNTER — OFFICE VISIT (OUTPATIENT)
Dept: NEUROLOGY | Facility: CLINIC | Age: 24
End: 2024-07-17
Payer: MEDICAID

## 2024-07-17 VITALS
TEMPERATURE: 98 F | SYSTOLIC BLOOD PRESSURE: 120 MMHG | WEIGHT: 109.81 LBS | RESPIRATION RATE: 14 BRPM | HEIGHT: 64 IN | DIASTOLIC BLOOD PRESSURE: 80 MMHG | BODY MASS INDEX: 18.75 KG/M2 | OXYGEN SATURATION: 98 % | HEART RATE: 125 BPM

## 2024-07-17 DIAGNOSIS — E10.42 POLYNEUROPATHY DUE TO TYPE 1 DIABETES MELLITUS: Primary | ICD-10-CM

## 2024-07-17 DIAGNOSIS — E10.42 POLYNEUROPATHY DUE TO TYPE 1 DIABETES MELLITUS: ICD-10-CM

## 2024-07-17 DIAGNOSIS — E10.40 DM NEUROPATHY, TYPE I DIABETES MELLITUS: ICD-10-CM

## 2024-07-17 LAB
OHS QRS DURATION: 78 MS
OHS QTC CALCULATION: 424 MS

## 2024-07-17 PROCEDURE — 3008F BODY MASS INDEX DOCD: CPT | Mod: CPTII,,, | Performed by: PSYCHIATRY & NEUROLOGY

## 2024-07-17 PROCEDURE — 99214 OFFICE O/P EST MOD 30 MIN: CPT | Mod: PBBFAC,25 | Performed by: PSYCHIATRY & NEUROLOGY

## 2024-07-17 PROCEDURE — 93005 ELECTROCARDIOGRAM TRACING: CPT

## 2024-07-17 PROCEDURE — 3074F SYST BP LT 130 MM HG: CPT | Mod: CPTII,,, | Performed by: PSYCHIATRY & NEUROLOGY

## 2024-07-17 PROCEDURE — 3079F DIAST BP 80-89 MM HG: CPT | Mod: CPTII,,, | Performed by: PSYCHIATRY & NEUROLOGY

## 2024-07-17 PROCEDURE — G2211 COMPLEX E/M VISIT ADD ON: HCPCS | Mod: S$PBB,,, | Performed by: PSYCHIATRY & NEUROLOGY

## 2024-07-17 PROCEDURE — 3051F HG A1C>EQUAL 7.0%<8.0%: CPT | Mod: CPTII,,, | Performed by: PSYCHIATRY & NEUROLOGY

## 2024-07-17 PROCEDURE — 99214 OFFICE O/P EST MOD 30 MIN: CPT | Mod: S$PBB,,, | Performed by: PSYCHIATRY & NEUROLOGY

## 2024-07-17 PROCEDURE — 1159F MED LIST DOCD IN RCRD: CPT | Mod: CPTII,,, | Performed by: PSYCHIATRY & NEUROLOGY

## 2024-07-17 RX ORDER — TIZANIDINE 4 MG/1
4 TABLET ORAL NIGHTLY
COMMUNITY
Start: 2024-07-11

## 2024-07-17 RX ORDER — CARBAMAZEPINE 300 MG/1
300 CAPSULE, EXTENDED RELEASE ORAL 3 TIMES DAILY
Qty: 90 CAPSULE | Refills: 5 | Status: SHIPPED | OUTPATIENT
Start: 2024-07-17 | End: 2025-01-13

## 2024-07-17 RX ORDER — CALCIUM CITRATE/VITAMIN D3 200MG-6.25
TABLET ORAL 3 TIMES DAILY
COMMUNITY

## 2024-07-17 RX ORDER — AMITRIPTYLINE HYDROCHLORIDE 25 MG/1
25 TABLET, FILM COATED ORAL NIGHTLY
Qty: 30 TABLET | Refills: 4 | Status: SHIPPED | OUTPATIENT
Start: 2024-07-17 | End: 2024-08-16

## 2024-07-17 RX ORDER — AMITRIPTYLINE HYDROCHLORIDE 10 MG/1
10 TABLET, FILM COATED ORAL NIGHTLY
COMMUNITY
Start: 2024-06-10 | End: 2024-07-17 | Stop reason: SDUPTHER

## 2024-07-17 RX ORDER — DULOXETIN HYDROCHLORIDE 20 MG/1
20 CAPSULE, DELAYED RELEASE ORAL DAILY
COMMUNITY
Start: 2024-07-01

## 2024-07-17 RX ORDER — DICLOFENAC SODIUM 10 MG/G
GEL TOPICAL
COMMUNITY
Start: 2024-06-20

## 2024-07-17 RX ORDER — ROPINIROLE 0.5 MG/1
0.5 TABLET, FILM COATED ORAL NIGHTLY
COMMUNITY
Start: 2024-07-03 | End: 2024-07-17

## 2024-09-28 ENCOUNTER — HOSPITAL ENCOUNTER (EMERGENCY)
Facility: HOSPITAL | Age: 24
Discharge: HOME | End: 2024-09-28
Attending: EMERGENCY MEDICINE
Payer: MEDICAID

## 2024-09-28 VITALS
OXYGEN SATURATION: 100 % | BODY MASS INDEX: 20.49 KG/M2 | TEMPERATURE: 97 F | RESPIRATION RATE: 18 BRPM | DIASTOLIC BLOOD PRESSURE: 63 MMHG | HEART RATE: 98 BPM | HEIGHT: 64 IN | SYSTOLIC BLOOD PRESSURE: 109 MMHG | WEIGHT: 120 LBS

## 2024-09-28 DIAGNOSIS — R73.9 ACUTE HYPERGLYCEMIA: ICD-10-CM

## 2024-09-28 DIAGNOSIS — M79.10 MYALGIA: Primary | ICD-10-CM

## 2024-09-28 LAB
ALBUMIN SERPL-MCNC: 4 G/DL (ref 3.5–5)
ALBUMIN/GLOB SERPL: 1.4 RATIO (ref 1.1–2)
ALP SERPL-CCNC: 91 UNIT/L (ref 40–150)
ALT SERPL-CCNC: 19 UNIT/L (ref 0–55)
AMPHET UR QL SCN: NEGATIVE
ANION GAP SERPL CALC-SCNC: 9 MEQ/L
AST SERPL-CCNC: 17 UNIT/L (ref 5–34)
B-HCG UR QL: NEGATIVE
BARBITURATE SCN PRESENT UR: NEGATIVE
BASOPHILS # BLD AUTO: 0.05 X10(3)/MCL
BASOPHILS NFR BLD AUTO: 0.6 %
BENZODIAZ UR QL SCN: NEGATIVE
BILIRUB SERPL-MCNC: 0.3 MG/DL
BILIRUB UR QL STRIP.AUTO: NEGATIVE
BUN SERPL-MCNC: 12 MG/DL (ref 7–18.7)
CALCIUM SERPL-MCNC: 9.3 MG/DL (ref 8.4–10.2)
CANNABINOIDS UR QL SCN: NEGATIVE
CHLORIDE SERPL-SCNC: 101 MMOL/L (ref 98–107)
CK SERPL-CCNC: 146 U/L (ref 29–168)
CLARITY UR: CLEAR
CO2 SERPL-SCNC: 25 MMOL/L (ref 22–29)
COCAINE UR QL SCN: NEGATIVE
COLOR UR AUTO: YELLOW
CREAT SERPL-MCNC: 0.85 MG/DL (ref 0.55–1.02)
CREAT/UREA NIT SERPL: 14
EOSINOPHIL # BLD AUTO: 0.42 X10(3)/MCL (ref 0–0.9)
EOSINOPHIL NFR BLD AUTO: 5.3 %
ERYTHROCYTE [DISTWIDTH] IN BLOOD BY AUTOMATED COUNT: 12.4 % (ref 11.5–17)
FENTANYL UR QL SCN: NEGATIVE
FLUAV AG UPPER RESP QL IA.RAPID: NOT DETECTED
FLUBV AG UPPER RESP QL IA.RAPID: NOT DETECTED
GFR SERPLBLD CREATININE-BSD FMLA CKD-EPI: >60 ML/MIN/1.73/M2
GLOBULIN SER-MCNC: 2.9 GM/DL (ref 2.4–3.5)
GLUCOSE SERPL-MCNC: 454 MG/DL (ref 74–100)
GLUCOSE UR QL STRIP: ABNORMAL
HCT VFR BLD AUTO: 39.5 % (ref 37–47)
HGB BLD-MCNC: 12.9 G/DL (ref 12–16)
HGB UR QL STRIP: NEGATIVE
IMM GRANULOCYTES # BLD AUTO: 0.01 X10(3)/MCL (ref 0–0.04)
IMM GRANULOCYTES NFR BLD AUTO: 0.1 %
KETONES UR QL STRIP: ABNORMAL
LEUKOCYTE ESTERASE UR QL STRIP: NEGATIVE
LYMPHOCYTES # BLD AUTO: 1.76 X10(3)/MCL (ref 0.6–4.6)
LYMPHOCYTES NFR BLD AUTO: 22.4 %
MCH RBC QN AUTO: 27.9 PG (ref 27–31)
MCHC RBC AUTO-ENTMCNC: 32.7 G/DL (ref 33–36)
MCV RBC AUTO: 85.5 FL (ref 80–94)
MDMA UR QL SCN: NEGATIVE
MONOCYTES # BLD AUTO: 0.32 X10(3)/MCL (ref 0.1–1.3)
MONOCYTES NFR BLD AUTO: 4.1 %
NEUTROPHILS # BLD AUTO: 5.31 X10(3)/MCL (ref 2.1–9.2)
NEUTROPHILS NFR BLD AUTO: 67.5 %
NITRITE UR QL STRIP: NEGATIVE
NRBC BLD AUTO-RTO: 0 %
OPIATES UR QL SCN: NEGATIVE
PCP UR QL: NEGATIVE
PH UR STRIP: 6 [PH]
PH UR: 6 [PH] (ref 3–11)
PLATELET # BLD AUTO: 327 X10(3)/MCL (ref 130–400)
PMV BLD AUTO: 10.3 FL (ref 7.4–10.4)
POCT GLUCOSE: 316 MG/DL (ref 70–110)
POTASSIUM SERPL-SCNC: 4.8 MMOL/L (ref 3.5–5.1)
PROT SERPL-MCNC: 6.9 GM/DL (ref 6.4–8.3)
PROT UR QL STRIP: NEGATIVE
RBC # BLD AUTO: 4.62 X10(6)/MCL (ref 4.2–5.4)
SARS-COV-2 RNA RESP QL NAA+PROBE: NOT DETECTED
SODIUM SERPL-SCNC: 135 MMOL/L (ref 136–145)
SP GR UR STRIP.AUTO: 1.01 (ref 1–1.03)
SPECIFIC GRAVITY, URINE AUTO (.000) (OHS): 1.01 (ref 1–1.03)
UROBILINOGEN UR STRIP-ACNC: 0.2
WBC # BLD AUTO: 7.87 X10(3)/MCL (ref 4.5–11.5)

## 2024-09-28 PROCEDURE — 85025 COMPLETE CBC W/AUTO DIFF WBC: CPT | Performed by: EMERGENCY MEDICINE

## 2024-09-28 PROCEDURE — 96374 THER/PROPH/DIAG INJ IV PUSH: CPT

## 2024-09-28 PROCEDURE — 82962 GLUCOSE BLOOD TEST: CPT

## 2024-09-28 PROCEDURE — 25000003 PHARM REV CODE 250: Performed by: EMERGENCY MEDICINE

## 2024-09-28 PROCEDURE — 99284 EMERGENCY DEPT VISIT MOD MDM: CPT | Mod: 25

## 2024-09-28 PROCEDURE — 81003 URINALYSIS AUTO W/O SCOPE: CPT | Performed by: EMERGENCY MEDICINE

## 2024-09-28 PROCEDURE — 80307 DRUG TEST PRSMV CHEM ANLYZR: CPT | Performed by: EMERGENCY MEDICINE

## 2024-09-28 PROCEDURE — 0240U COVID/FLU A&B PCR: CPT | Performed by: EMERGENCY MEDICINE

## 2024-09-28 PROCEDURE — 81025 URINE PREGNANCY TEST: CPT | Performed by: EMERGENCY MEDICINE

## 2024-09-28 PROCEDURE — 80053 COMPREHEN METABOLIC PANEL: CPT | Performed by: EMERGENCY MEDICINE

## 2024-09-28 PROCEDURE — 82550 ASSAY OF CK (CPK): CPT | Performed by: EMERGENCY MEDICINE

## 2024-09-28 PROCEDURE — 63600175 PHARM REV CODE 636 W HCPCS: Performed by: EMERGENCY MEDICINE

## 2024-09-28 RX ORDER — TRAZODONE HYDROCHLORIDE 50 MG/1
50 TABLET ORAL NIGHTLY
Qty: 30 TABLET | Refills: 0 | Status: SHIPPED | OUTPATIENT
Start: 2024-09-28

## 2024-09-28 RX ADMIN — SODIUM CHLORIDE 1000 ML: 9 INJECTION, SOLUTION INTRAVENOUS at 09:09

## 2024-09-28 RX ADMIN — INSULIN HUMAN 10 UNITS: 100 INJECTION, SOLUTION PARENTERAL at 10:09

## 2024-09-28 RX ADMIN — SODIUM CHLORIDE 1000 ML: 9 INJECTION, SOLUTION INTRAVENOUS at 10:09

## 2024-09-28 NOTE — ED PROVIDER NOTES
Encounter Date: 9/28/2024       History     Chief Complaint   Patient presents with    Generalized Body Aches     Chronic generalized pain, patient has history of polyneuropathy.     Patient is a 24-year-old female presenting with complain of generalized myalgias since yesterday evening.  Patient states she has a history of diabetes and polyneuropathy.  Patient also complains of some intermittent abdominal pain.  Patient denies fever chills, nausea,  or vomiting, cough or shortness of breath.  Patient states her last menstrual period was in May.    Patient has a history of diabetes and wears an insulin pump      Review of patient's allergies indicates:   Allergen Reactions    Bismuth subsalicylate Anaphylaxis    Banana     Opioids - morphine analogues Itching    Cucumber Other (See Comments)     Itchy throat    Morphine Itching     Past Medical History:   Diagnosis Date    Anxiety disorder, unspecified     Asthma     Neuropathy     Type 1 diabetes      Past Surgical History:   Procedure Laterality Date    WISDOM TOOTH EXTRACTION       No family history on file.  Social History     Tobacco Use    Smoking status: Never    Smokeless tobacco: Never   Substance Use Topics    Alcohol use: Not Currently    Drug use: Not Currently     Review of Systems   Constitutional:  Negative for chills and fever.   HENT: Negative.     Respiratory: Negative.     Cardiovascular: Negative.    Gastrointestinal:  Positive for abdominal pain. Negative for diarrhea, nausea and vomiting.   Genitourinary: Negative.    Musculoskeletal:  Positive for myalgias. Negative for arthralgias, back pain, gait problem and joint swelling.   Neurological: Negative.        Physical Exam     Initial Vitals [09/28/24 0813]   BP Pulse Resp Temp SpO2   (!) 136/92 (!) 140 20 97 °F (36.1 °C) 99 %      MAP       --         Physical Exam    Nursing note and vitals reviewed.  Constitutional: She appears well-developed and well-nourished.   HENT:   Head: Normocephalic  and atraumatic.   Neck: Neck supple.   Normal range of motion.  Cardiovascular:  Normal rate, regular rhythm and normal heart sounds.           Pulmonary/Chest: Breath sounds normal.   Abdominal: Abdomen is soft. She exhibits no distension. There is no abdominal tenderness. There is no guarding.   Musculoskeletal:         General: Normal range of motion.      Cervical back: Normal range of motion and neck supple.     Neurological: She is alert and oriented to person, place, and time. She has normal strength.   Skin: Skin is warm.   Psychiatric:   Patient appears anxious.         ED Course   Procedures  Labs Reviewed   COMPREHENSIVE METABOLIC PANEL - Abnormal       Result Value    Sodium 135 (*)     Potassium 4.8      Chloride 101      CO2 25      Glucose 454 (*)     Blood Urea Nitrogen 12.0      Creatinine 0.85      Calcium 9.3      Protein Total 6.9      Albumin 4.0      Globulin 2.9      Albumin/Globulin Ratio 1.4      Bilirubin Total 0.3      ALP 91      ALT 19      AST 17      eGFR >60      Anion Gap 9.0      BUN/Creatinine Ratio 14     URINALYSIS, REFLEX TO URINE CULTURE - Abnormal    Color, UA Yellow      Appearance, UA Clear      Specific Gravity, UA 1.010      pH, UA 6.0      Protein, UA Negative      Glucose, UA 3+ (*)     Ketones, UA 2+ (*)     Blood, UA Negative      Bilirubin, UA Negative      Urobilinogen, UA 0.2      Nitrites, UA Negative      Leukocyte Esterase, UA Negative     CBC WITH DIFFERENTIAL - Abnormal    WBC 7.87      RBC 4.62      Hgb 12.9      Hct 39.5      MCV 85.5      MCH 27.9      MCHC 32.7 (*)     RDW 12.4      Platelet 327      MPV 10.3      Neut % 67.5      Lymph % 22.4      Mono % 4.1      Eos % 5.3      Basophil % 0.6      Lymph # 1.76      Neut # 5.31      Mono # 0.32      Eos # 0.42      Baso # 0.05      IG# 0.01      IG% 0.1      NRBC% 0.0     POCT GLUCOSE - Abnormal    POCT Glucose 316 (*)    COVID/FLU A&B PCR - Normal    Influenza A PCR Not Detected      Influenza B PCR Not  Detected      SARS-CoV-2 PCR Not Detected      Narrative:     The Xpert Xpress SARS-CoV-2/FLU/RSV plus is a rapid, multiplexed real-time PCR test intended for the simultaneous qualitative detection and differentiation of SARS-CoV-2, Influenza A, Influenza B, and respiratory syncytial virus (RSV) viral RNA in either nasopharyngeal swab or nasal swab specimens.         PREGNANCY TEST, URINE RAPID - Normal    hCG Qualitative, Urine Negative     DRUG SCREEN, URINE (BEAKER) - Normal    Amphetamines, Urine Negative      Barbiturates, Urine Negative      Benzodiazepine, Urine Negative      Cannabinoids, Urine Negative      Cocaine, Urine Negative      Fentanyl, Urine Negative      MDMA, Urine Negative      Opiates, Urine Negative      Phencyclidine, Urine Negative      pH, Urine 6.0      Specific Gravity, Urine Auto 1.010      Narrative:     Cut off concentrations:    Amphetamines - 1000 ng/ml  Barbiturates - 200 ng/ml  Benzodiazepine - 200 ng/ml  Cannabinoids (THC) - 50 ng/ml  Cocaine - 300 ng/ml  Fentanyl - 1.0 ng/ml  MDMA - 500 ng/ml  Opiates - 300 ng/ml   Phencyclidine (PCP) - 25 ng/ml    Specimen submitted for drug analysis and tested for pH and specific gravity in order to evaluate sample integrity. Suspect tampering if specific gravity is <1.003 and/or pH is not within the range of 4.5 - 8.0  False negatives may result form substances such as bleach added to urine.  False positives may result for the presence of a substance with similar chemical structure to the drug or its metabolite.    This test provides only a PRELIMINARY analytical test result. A more specific alternate chemical method must be used in order to obtain a confirmed analytical result. Gas chromatography/mass spectrometry (GC/MS) is the preferred confirmatory method. Other chemical confirmation methods are available. Clinical consideration and professional judgement should be applied to any drug of abuse test result, particularly when preliminary  positive results are used.    Positive results will be confirmed only at the physicians request. Unconfirmed screening results are to be used only for medical purposes (treatment).        CK - Normal    Creatine Kinase 146     CBC W/ AUTO DIFFERENTIAL    Narrative:     The following orders were created for panel order CBC auto differential.  Procedure                               Abnormality         Status                     ---------                               -----------         ------                     CBC with Differential[[id number]]       Abnormal            Final result                 Please view results for these tests on the individual orders.   POCT GLUCOSE, HAND-HELD DEVICE          Imaging Results    None          Medications   sodium chloride 0.9% bolus 1,000 mL 1,000 mL (1,000 mLs Intravenous New Bag 9/28/24 1012)   sodium chloride 0.9% bolus 1,000 mL 1,000 mL (0 mLs Intravenous Stopped 9/28/24 1011)   insulin regular injection 10 Units 0.1 mL (10 Units Intravenous Given 9/28/24 1026)     Medical Decision Making  Differential diagnosis: Myalgias, viral syndrome, UTI, hyperglycemia    Amount and/or Complexity of Data Reviewed  Labs: ordered.     Details: Abnormal labs include blood sugar of 454  Discussion of management or test interpretation with external provider(s): Patient was given 2 L normal saline while in the ER and 10 units of regular insulin.  Will discharge to home.  Time of discharge, patient denies any complaints and states she was feeling much better.    Risk  OTC drugs.  Prescription drug management.               ED Course as of 09/28/24 1028   Sat Sep 28, 2024   0927 Patient has received 2 L normal saline.  She states she is feeling much better. [KG]   1010 After 2 L of fluid, repeat blood sugar is 316.  Will give insulin 10 units IV [KG]   1010 Patient also requesting a sleep aid for the next couple of weeks. [KG]      ED Course User Index  [KG] Abner Vega MD                            Clinical Impression:  Final diagnoses:  [M79.10] Myalgia (Primary)  [R73.9] Acute hyperglycemia          ED Disposition Condition    Discharge Stable          ED Prescriptions       Medication Sig Dispense Start Date End Date Auth. Provider    traZODone (DESYREL) 50 MG tablet Take 1 tablet (50 mg total) by mouth every evening. 30 tablet 9/28/2024 -- Abner Vega MD          Follow-up Information       Follow up With Specialties Details Why Contact Info    Leonides Pimentel MD Family Medicine In 3 days  [address] [facility] [address]  [phone]      [facility] - Emergency Dept Emergency Medicine  As needed [address] [place] [id number] [phone]             Abner Vega MD  09/28/24 1014       Abner Vega MD  09/28/24 8487

## 2024-09-29 ENCOUNTER — HOSPITAL ENCOUNTER (EMERGENCY)
Facility: HOSPITAL | Age: 24
Discharge: HOME | End: 2024-09-29
Attending: STUDENT IN AN ORGANIZED HEALTH CARE EDUCATION/TRAINING PROGRAM
Payer: MEDICAID

## 2024-09-29 VITALS
TEMPERATURE: 98 F | WEIGHT: 120 LBS | HEART RATE: 67 BPM | OXYGEN SATURATION: 100 % | BODY MASS INDEX: 20.49 KG/M2 | HEIGHT: 64 IN | DIASTOLIC BLOOD PRESSURE: 67 MMHG | RESPIRATION RATE: 18 BRPM | SYSTOLIC BLOOD PRESSURE: 132 MMHG

## 2024-09-29 DIAGNOSIS — R73.9 HYPERGLYCEMIA: Primary | ICD-10-CM

## 2024-09-29 LAB
ANION GAP SERPL CALC-SCNC: 11 MEQ/L
B-HCG UR QL: NEGATIVE
BASOPHILS # BLD AUTO: 0.06 X10(3)/MCL
BASOPHILS NFR BLD AUTO: 1.1 %
BILIRUB UR QL STRIP.AUTO: NEGATIVE
BUN SERPL-MCNC: 15 MG/DL (ref 7–18.7)
CALCIUM SERPL-MCNC: 10.2 MG/DL (ref 8.4–10.2)
CHLORIDE SERPL-SCNC: 99 MMOL/L (ref 98–107)
CLARITY UR: CLEAR
CO2 SERPL-SCNC: 24 MMOL/L (ref 22–29)
COLOR UR AUTO: YELLOW
CREAT SERPL-MCNC: 0.89 MG/DL (ref 0.55–1.02)
CREAT/UREA NIT SERPL: 17
EOSINOPHIL # BLD AUTO: 0.53 X10(3)/MCL (ref 0–0.9)
EOSINOPHIL NFR BLD AUTO: 9.5 %
ERYTHROCYTE [DISTWIDTH] IN BLOOD BY AUTOMATED COUNT: 12.3 % (ref 11.5–17)
GFR SERPLBLD CREATININE-BSD FMLA CKD-EPI: >60 ML/MIN/1.73/M2
GLUCOSE SERPL-MCNC: 447 MG/DL (ref 74–100)
GLUCOSE UR QL STRIP: ABNORMAL
HCT VFR BLD AUTO: 42 % (ref 37–47)
HGB BLD-MCNC: 13.6 G/DL (ref 12–16)
HGB UR QL STRIP: NEGATIVE
IMM GRANULOCYTES # BLD AUTO: 0 X10(3)/MCL (ref 0–0.04)
IMM GRANULOCYTES NFR BLD AUTO: 0 %
KETONES UR QL STRIP: ABNORMAL
LEUKOCYTE ESTERASE UR QL STRIP: NEGATIVE
LYMPHOCYTES # BLD AUTO: 2.21 X10(3)/MCL (ref 0.6–4.6)
LYMPHOCYTES NFR BLD AUTO: 39.5 %
MCH RBC QN AUTO: 27.7 PG (ref 27–31)
MCHC RBC AUTO-ENTMCNC: 32.4 G/DL (ref 33–36)
MCV RBC AUTO: 85.5 FL (ref 80–94)
MONOCYTES # BLD AUTO: 0.3 X10(3)/MCL (ref 0.1–1.3)
MONOCYTES NFR BLD AUTO: 5.4 %
NEUTROPHILS # BLD AUTO: 2.49 X10(3)/MCL (ref 2.1–9.2)
NEUTROPHILS NFR BLD AUTO: 44.5 %
NITRITE UR QL STRIP: NEGATIVE
NRBC BLD AUTO-RTO: 0 %
PH UR STRIP: 7 [PH]
PLATELET # BLD AUTO: 340 X10(3)/MCL (ref 130–400)
PMV BLD AUTO: 10.9 FL (ref 7.4–10.4)
POCT GLUCOSE: 330 MG/DL (ref 70–110)
POCT GLUCOSE: 472 MG/DL (ref 70–110)
POTASSIUM SERPL-SCNC: 4.7 MMOL/L (ref 3.5–5.1)
PROT UR QL STRIP: NEGATIVE
RBC # BLD AUTO: 4.91 X10(6)/MCL (ref 4.2–5.4)
SODIUM SERPL-SCNC: 134 MMOL/L (ref 136–145)
SP GR UR STRIP.AUTO: 1.01 (ref 1–1.03)
TROPONIN I SERPL-MCNC: <0.01 NG/ML (ref 0–0.04)
UROBILINOGEN UR STRIP-ACNC: 0.2
WBC # BLD AUTO: 5.59 X10(3)/MCL (ref 4.5–11.5)

## 2024-09-29 PROCEDURE — 82962 GLUCOSE BLOOD TEST: CPT

## 2024-09-29 PROCEDURE — 84484 ASSAY OF TROPONIN QUANT: CPT | Performed by: STUDENT IN AN ORGANIZED HEALTH CARE EDUCATION/TRAINING PROGRAM

## 2024-09-29 PROCEDURE — 80048 BASIC METABOLIC PNL TOTAL CA: CPT | Performed by: STUDENT IN AN ORGANIZED HEALTH CARE EDUCATION/TRAINING PROGRAM

## 2024-09-29 PROCEDURE — 81003 URINALYSIS AUTO W/O SCOPE: CPT | Performed by: STUDENT IN AN ORGANIZED HEALTH CARE EDUCATION/TRAINING PROGRAM

## 2024-09-29 PROCEDURE — 63600175 PHARM REV CODE 636 W HCPCS: Performed by: STUDENT IN AN ORGANIZED HEALTH CARE EDUCATION/TRAINING PROGRAM

## 2024-09-29 PROCEDURE — 81025 URINE PREGNANCY TEST: CPT | Performed by: STUDENT IN AN ORGANIZED HEALTH CARE EDUCATION/TRAINING PROGRAM

## 2024-09-29 PROCEDURE — 85025 COMPLETE CBC W/AUTO DIFF WBC: CPT | Performed by: STUDENT IN AN ORGANIZED HEALTH CARE EDUCATION/TRAINING PROGRAM

## 2024-09-29 PROCEDURE — 96374 THER/PROPH/DIAG INJ IV PUSH: CPT

## 2024-09-29 PROCEDURE — 25000003 PHARM REV CODE 250: Performed by: STUDENT IN AN ORGANIZED HEALTH CARE EDUCATION/TRAINING PROGRAM

## 2024-09-29 PROCEDURE — 99284 EMERGENCY DEPT VISIT MOD MDM: CPT | Mod: 25

## 2024-09-29 RX ORDER — SODIUM CHLORIDE 9 MG/ML
1000 INJECTION, SOLUTION INTRAVENOUS
Status: COMPLETED | OUTPATIENT
Start: 2024-09-29 | End: 2024-09-29

## 2024-09-29 RX ORDER — TRAMADOL HYDROCHLORIDE 50 MG/1
50 TABLET ORAL
Status: COMPLETED | OUTPATIENT
Start: 2024-09-29 | End: 2024-09-29

## 2024-09-29 RX ADMIN — INSULIN HUMAN 4 UNITS: 100 INJECTION, SOLUTION PARENTERAL at 06:09

## 2024-09-29 RX ADMIN — TRAMADOL HYDROCHLORIDE 50 MG: 50 TABLET, COATED ORAL at 05:09

## 2024-09-29 RX ADMIN — SODIUM CHLORIDE 1000 ML: 9 INJECTION, SOLUTION INTRAVENOUS at 06:09

## 2024-09-29 NOTE — ED NOTES
Alert arrived with severe pain to bilateral rib area, and both her legs. She was seen here and treated for CBG in 460's, sent home after treatment and on her insulin pump with problems with the sensors. She slept till 3 am today with the pain.  here.

## 2024-09-29 NOTE — ED PROVIDER NOTES
"Encounter Date: 9/29/2024       History     Chief Complaint   Patient presents with    Hyperglycemia     Patient seen here yesterday for hyperglycemia, given fluids and prescribed medication to sleep. Awoken this morning with same pain to bilateral ribs. CBG at this time 472 mg/dl, patient has a insulin pump since may of this year     Patient is a 24-year-old  female with a history of type 1 diabetes with an insulin pump who presented to the ER today tingling to her lower feet chronically and chest discomfort.  She was seen here less than 1 day ago was diagnosed with a hyperglycemia with a glucose nearing 500 which improved down to 300 prior to discharge.  Patient states she went home ate some "hamburger or help her" and noticed her sugar went up even higher.  Who states it was symptoms did not change but she was able to take a nap and came back after she woke up from her nap.  She denies any shortness of breath, nausea, vomiting, diaphoresis.  She denies any abdominal pain, dysuria, hematuria.      Review of patient's allergies indicates:   Allergen Reactions    Bismuth subsalicylate Anaphylaxis    Banana     Opioids - morphine analogues Itching    Cucumber Other (See Comments)     Itchy throat    Morphine Itching     Past Medical History:   Diagnosis Date    Anxiety disorder, unspecified     Asthma     Neuropathy     Type 1 diabetes      Past Surgical History:   Procedure Laterality Date    WISDOM TOOTH EXTRACTION       No family history on file.  Social History     Tobacco Use    Smoking status: Never    Smokeless tobacco: Never   Substance Use Topics    Alcohol use: Not Currently    Drug use: Not Currently     Review of Systems   Constitutional:  Negative for chills, fatigue and fever.   HENT:  Negative for congestion, sore throat and trouble swallowing.    Eyes:  Negative for pain and visual disturbance.   Respiratory:  Negative for cough, shortness of breath and wheezing.    Cardiovascular:  " Positive for chest pain. Negative for palpitations.   Gastrointestinal:  Negative for abdominal pain, blood in stool, constipation, diarrhea, nausea and vomiting.   Genitourinary:  Negative for dysuria and hematuria.   Musculoskeletal:  Positive for myalgias. Negative for back pain.   Skin:  Negative for rash and wound.   Neurological:  Negative for seizures, syncope and headaches.   Psychiatric/Behavioral:  Negative for confusion. The patient is not nervous/anxious.        Physical Exam     Initial Vitals [09/29/24 0540]   BP Pulse Resp Temp SpO2   (!) 145/82 98 18 97.7 °F (36.5 °C) 98 %      MAP       --         Physical Exam    Nursing note and vitals reviewed.  Constitutional: She appears well-developed and well-nourished. She is not diaphoretic. No distress.   HENT:   Head: Normocephalic.   Right Ear: External ear normal.   Left Ear: External ear normal.   Nose: Nose normal.   Eyes: Conjunctivae and EOM are normal. Right eye exhibits no discharge. Left eye exhibits no discharge. No scleral icterus.   Neck:   Normal range of motion.  Cardiovascular:  Normal rate, regular rhythm and normal heart sounds.     Exam reveals no gallop and no friction rub.       No murmur heard.  Pulmonary/Chest: Breath sounds normal. No stridor. No respiratory distress. She has no wheezes. She has no rhonchi. She has no rales. She exhibits no tenderness.   Abdominal: Abdomen is soft. She exhibits no distension. There is no abdominal tenderness. There is no rebound and no guarding.   Musculoskeletal:         General: Normal range of motion.      Cervical back: Normal range of motion.      Comments: No lower extremity edema noted bilaterally.  Cap refill within normal limits toes bilaterally.     Neurological: She is alert.   Skin: Skin is warm. No rash noted. No erythema.   Psychiatric: She has a normal mood and affect. Her behavior is normal.         ED Course   Procedures  Labs Reviewed   BASIC METABOLIC PANEL - Abnormal       Result  Value    Sodium 134 (*)     Potassium 4.7      Chloride 99      CO2 24      Glucose 447 (*)     Blood Urea Nitrogen 15.0      Creatinine 0.89      BUN/Creatinine Ratio 17      Calcium 10.2      Anion Gap 11.0      eGFR >60     URINALYSIS, REFLEX TO URINE CULTURE - Abnormal    Color, UA Yellow      Appearance, UA Clear      Specific Gravity, UA 1.015      pH, UA 7.0      Protein, UA Negative      Glucose, UA 3+ (*)     Ketones, UA 2+ (*)     Blood, UA Negative      Bilirubin, UA Negative      Urobilinogen, UA 0.2      Nitrites, UA Negative      Leukocyte Esterase, UA Negative     CBC WITH DIFFERENTIAL - Abnormal    WBC 5.59      RBC 4.91      Hgb 13.6      Hct 42.0      MCV 85.5      MCH 27.7      MCHC 32.4 (*)     RDW 12.3      Platelet 340      MPV 10.9 (*)     Neut % 44.5      Lymph % 39.5      Mono % 5.4      Eos % 9.5      Basophil % 1.1      Lymph # 2.21      Neut # 2.49      Mono # 0.30      Eos # 0.53      Baso # 0.06      IG# 0.00      IG% 0.0      NRBC% 0.0     POCT GLUCOSE - Abnormal    POCT Glucose 472 (*)    POCT GLUCOSE - Abnormal    POCT Glucose 330 (*)    TROPONIN I - Normal    Troponin-I <0.010     HCG QUALITATIVE URINE - Normal    hCG Qualitative, Urine Negative     CBC W/ AUTO DIFFERENTIAL    Narrative:     The following orders were created for panel order CBC Auto Differential.  Procedure                               Abnormality         Status                     ---------                               -----------         ------                     CBC with Differential[[id number]]       Abnormal            Final result                 Please view results for these tests on the individual orders.   POCT GLUCOSE, HAND-HELD DEVICE          Imaging Results              X-Ray Chest 1 View (In process)                   X-Rays:   Independently Interpreted Readings:   Chest X-Ray: Normal heart size.  No infiltrates.  No acute abnormalities.     Medications   0.9%  NaCl infusion (1,000 mLs Intravenous  New Bag 9/29/24 0602)   insulin regular injection 4 Units 0.04 mL (4 Units Intravenous Given 9/29/24 0601)   traMADoL tablet 50 mg (50 mg Oral Given 9/29/24 0558)     Medical Decision Making  Differentials:  ACS, hyperglycemia, neuropathy, pneumothorax, pneumonia   History in his the patient   24-year-old well-appearing female presents to the ER today for neuropathy.  Hyperglycemia noted on 1st POC glucose.  Fluids and insulin given.  Patient was seen here less than 12 hours ago was not in DKA low suspicion for DKA this time.  Anion gap of 12 and CO2 greater than 20.  No evidence of DKA or HHS at this time.  Tramadol given and after glucose improve patient improve.  I had a long discussion with the patient about better glycemic control.  Troponin negative.  Chest x-ray showed no acute process.  All questions were answered in layman's terms and return precautions were discussed.    Amount and/or Complexity of Data Reviewed  Labs: ordered. Decision-making details documented in ED Course.  Radiology: ordered. Decision-making details documented in ED Course.    Risk  OTC drugs.  Prescription drug management.                                      Clinical Impression:  Final diagnoses:  [R73.9] Hyperglycemia (Primary)          ED Disposition Condition    Discharge Stable          ED Prescriptions    None       Follow-up Information       Follow up With Specialties Details Why Contact Info    [facility] - Emergency Dept Emergency Medicine  If symptoms worsen [address] [facility] [address] [phone]    Leonides Pimentel MD Family Medicine Schedule an appointment as soon as possible for a visit   [address] [facility] [address]  [phone]               Kurt Mayer MD  09/29/24 0722

## 2024-10-09 ENCOUNTER — TELEPHONE (OUTPATIENT)
Dept: NEUROLOGY | Facility: CLINIC | Age: 24
End: 2024-10-09
Payer: MEDICAID

## 2024-10-09 NOTE — TELEPHONE ENCOUNTER
Spoke with patient to verify she received the clarification she needed, she verbalized she understood.

## 2024-10-09 NOTE — TELEPHONE ENCOUNTER
----- Message from Irma sent at 10/9/2024  1:52 PM CDT -----  Regarding: RE: approval for surgery  Good afternoon,    I have spoken with this patient a little while ago. Her procedure has been approved.  ----- Message -----  From: Laverne Leiva MA  Sent: 10/9/2024   1:30 PM CDT  To: Irma Oakes  Subject: FW: approval for surgery                         Good afternoon,    Could you help clarify this for me before calling the patient? I do see that you contacted the patient and it was denied by insurance.    Thank you.  ----- Message -----  From: Mickie Fink  Sent: 10/9/2024  10:07 AM CDT  To: [facility] Neurology Clinical Support Staff  Subject: approval for surgery                             Pt called to speak to Dr. Dennis about her surgery in [place] being denied bc they need an approval from Dr. Dennis. Pt states that surgery is scheduled for tomorrow at 10am.      Pt # [phone]

## 2025-04-21 ENCOUNTER — TELEPHONE (OUTPATIENT)
Dept: NEUROLOGY | Facility: CLINIC | Age: 25
End: 2025-04-21
Payer: MEDICAID

## 2025-04-21 NOTE — TELEPHONE ENCOUNTER
Pt called wanting to increase Gabapentin 800mg to three times a day, currently taking two times a day. Please advise  Last office visit 7/17/2024  Next office visit 07/01/2025

## 2025-08-02 DIAGNOSIS — E10.42 POLYNEUROPATHY DUE TO TYPE 1 DIABETES MELLITUS: ICD-10-CM

## 2025-08-04 RX ORDER — CARBAMAZEPINE 300 MG/1
300 CAPSULE, EXTENDED RELEASE ORAL 3 TIMES DAILY
Qty: 90 CAPSULE | Refills: 5 | Status: SHIPPED | OUTPATIENT
Start: 2025-08-04